# Patient Record
Sex: MALE | Race: ASIAN | Employment: OTHER | URBAN - METROPOLITAN AREA
[De-identification: names, ages, dates, MRNs, and addresses within clinical notes are randomized per-mention and may not be internally consistent; named-entity substitution may affect disease eponyms.]

---

## 2022-01-05 ENCOUNTER — TELEPHONE (OUTPATIENT)
Dept: GASTROENTEROLOGY | Age: 62
End: 2022-01-05

## 2022-01-05 ENCOUNTER — OFFICE VISIT (OUTPATIENT)
Dept: INTERNAL MEDICINE CLINIC | Age: 62
End: 2022-01-05
Payer: COMMERCIAL

## 2022-01-05 VITALS
SYSTOLIC BLOOD PRESSURE: 104 MMHG | OXYGEN SATURATION: 97 % | DIASTOLIC BLOOD PRESSURE: 64 MMHG | BODY MASS INDEX: 25.46 KG/M2 | HEIGHT: 65 IN | WEIGHT: 152.8 LBS | HEART RATE: 79 BPM

## 2022-01-05 DIAGNOSIS — Z23 NEED FOR TETANUS, DIPHTHERIA, AND ACELLULAR PERTUSSIS (TDAP) VACCINE IN PATIENT OF ADOLESCENT AGE OR OLDER: ICD-10-CM

## 2022-01-05 DIAGNOSIS — Z23 NEED FOR INFLUENZA VACCINATION: ICD-10-CM

## 2022-01-05 DIAGNOSIS — Z12.11 SCREEN FOR COLON CANCER: Primary | ICD-10-CM

## 2022-01-05 DIAGNOSIS — Z12.11 SCREEN FOR COLON CANCER: ICD-10-CM

## 2022-01-05 DIAGNOSIS — Z00.00 ANNUAL PHYSICAL EXAM: Primary | ICD-10-CM

## 2022-01-05 PROCEDURE — 90472 IMMUNIZATION ADMIN EACH ADD: CPT | Performed by: INTERNAL MEDICINE

## 2022-01-05 PROCEDURE — 99386 PREV VISIT NEW AGE 40-64: CPT | Performed by: INTERNAL MEDICINE

## 2022-01-05 PROCEDURE — 90715 TDAP VACCINE 7 YRS/> IM: CPT | Performed by: INTERNAL MEDICINE

## 2022-01-05 PROCEDURE — 90471 IMMUNIZATION ADMIN: CPT | Performed by: INTERNAL MEDICINE

## 2022-01-05 PROCEDURE — 90686 IIV4 VACC NO PRSV 0.5 ML IM: CPT | Performed by: INTERNAL MEDICINE

## 2022-01-05 SDOH — ECONOMIC STABILITY: FOOD INSECURITY: WITHIN THE PAST 12 MONTHS, YOU WORRIED THAT YOUR FOOD WOULD RUN OUT BEFORE YOU GOT MONEY TO BUY MORE.: NEVER TRUE

## 2022-01-05 SDOH — ECONOMIC STABILITY: FOOD INSECURITY: WITHIN THE PAST 12 MONTHS, THE FOOD YOU BOUGHT JUST DIDN'T LAST AND YOU DIDN'T HAVE MONEY TO GET MORE.: NEVER TRUE

## 2022-01-05 ASSESSMENT — PATIENT HEALTH QUESTIONNAIRE - PHQ9
SUM OF ALL RESPONSES TO PHQ QUESTIONS 1-9: 0
1. LITTLE INTEREST OR PLEASURE IN DOING THINGS: 0
SUM OF ALL RESPONSES TO PHQ9 QUESTIONS 1 & 2: 0
2. FEELING DOWN, DEPRESSED OR HOPELESS: 0
SUM OF ALL RESPONSES TO PHQ QUESTIONS 1-9: 0

## 2022-01-05 ASSESSMENT — SOCIAL DETERMINANTS OF HEALTH (SDOH): HOW HARD IS IT FOR YOU TO PAY FOR THE VERY BASICS LIKE FOOD, HOUSING, MEDICAL CARE, AND HEATING?: NOT HARD AT ALL

## 2022-01-05 NOTE — PROGRESS NOTES
 No Known Problems Maternal Grandmother     No Known Problems Maternal Grandfather     No Known Problems Paternal Grandmother     No Known Problems Paternal Grandfather      Social History     Socioeconomic History    Marital status:      Spouse name: Not on file    Number of children: Not on file    Years of education: Not on file    Highest education level: Not on file   Occupational History    Not on file   Tobacco Use    Smoking status: Never Smoker    Smokeless tobacco: Never Used   Vaping Use    Vaping Use: Never used   Substance and Sexual Activity    Alcohol use: Not Currently    Drug use: Never    Sexual activity: Not Currently   Other Topics Concern    Not on file   Social History Narrative    Not on file     Social Determinants of Health     Financial Resource Strain: Low Risk     Difficulty of Paying Living Expenses: Not hard at all   Food Insecurity: No Food Insecurity    Worried About [address] [employer] in the Last Year: Never true    [address] [employer] in the Last Year: Never true   Transportation Needs:     Lack of Transportation (Medical): Not on file    Lack of Transportation (Non-Medical):  Not on file   Physical Activity:     Days of Exercise per Week: Not on file    Minutes of Exercise per Session: Not on file   Stress:     Feeling of Stress : Not on file   Social Connections:     Frequency of Communication with Friends and Family: Not on file    Frequency of Social Gatherings with Friends and Family: Not on file    Attends Christian Services: Not on file    Active Member of Clubs or Organizations: Not on file    Attends Club or Organization Meetings: Not on file    Marital Status: Not on file   Intimate Partner Violence:     Fear of Current or Ex-Partner: Not on file    Emotionally Abused: Not on file    Physically Abused: Not on file    Sexually Abused: Not on file   Housing Stability:     Unable to Pay for Housing in the Last Year: Not on file    Number of Places Lived in the Last Year: Not on file    Unstable Housing in the Last Year: Not on file       Review of Systems:  A comprehensive review of systems was negative except for what was noted in the HPI. Physical Exam:   Vitals:    01/05/22 1104   BP: 104/64   Pulse: 79   SpO2: 97%   Weight: 152 lb 12.8 oz (69.3 kg)   Height: 5' 5\" (1.651 m)     Body mass index is 25.43 kg/m². Constitutional: He is oriented to person, place, and time. He appears well-developed and well-nourished. No distress. HEENT:   Head: Normocephalic and atraumatic. Right Ear: Tympanic membrane, external ear and ear canal normal.   Left Ear: Tympanic membrane, external ear and ear canal normal.   Mouth/Throat: Oropharynx is clear and moist and mucous membranes are normal. No oropharyngeal exudate or posterior oropharyngeal erythema. He has no cervical adenopathy. Eyes: Conjunctivae and extraocular motions are normal. Pupils are equal, round, and reactive to light. Neck:  Supple. No JVD present. Carotid bruit is not present. No mass and no thyromegaly present. Cardiovascular: Normal rate, regular rhythm, normal heart sounds and intact distal pulses. Pulmonary/Chest: Effort normal and breath sounds normal. No respiratory distress. He has no wheezes, rhonchi or rales. Abdominal: Soft, non-tender. Bowel sounds and aorta are normal. There is no organomegaly, mass or bruit. Musculoskeletal: Normal range of motion. He exhibits no edema. Neurological: He is alert and oriented to person, place, and time. He has normal reflexes. No cranial nerve deficit. Coordination normal.   Skin: Skin is warm, dry and intact. No suspicious lesions are noted.     Preventive Care:  Health Maintenance   Topic Date Due    Hepatitis C screen  Never done    Depression Screen  Never done    HIV screen  Never done    Diabetes screen  Never done    Lipid screen  Never done    Colon cancer screen colonoscopy  Never done    DTaP/Tdap/Td vaccine (1 - Tdap) 07/21/2007    Shingles Vaccine (1 of 2) 12/02/2009    Flu vaccine (1) Never done    COVID-19 Vaccine  Completed    Hepatitis A vaccine  Aged Out    Hepatitis B vaccine  Aged Out    Hib vaccine  Aged Out    Meningococcal (ACWY) vaccine  Aged Out    Pneumococcal 0-64 years Vaccine  Aged Out        Recommendations for [employer] Due: see orders and patient instructions/AVS.

## 2022-01-05 NOTE — TELEPHONE ENCOUNTER
Pt needs colon prep sent to [facility] pharmacy: [address]   Prep Instructions emailed to [email or web address]    1/24/2022 @ 9:15am at [facility]

## 2022-01-06 ENCOUNTER — HOSPITAL ENCOUNTER (OUTPATIENT)
Age: 62
Discharge: HOME | End: 2022-01-06
Payer: COMMERCIAL

## 2022-01-06 DIAGNOSIS — Z00.00 ANNUAL PHYSICAL EXAM: ICD-10-CM

## 2022-01-06 LAB
A/G RATIO: 1.9 (ref 1.1–2.2)
ALBUMIN SERPL-MCNC: 4.6 G/DL (ref 3.4–5)
ALP BLD-CCNC: 55 U/L (ref 40–129)
ALT SERPL-CCNC: 30 U/L (ref 10–40)
ANION GAP SERPL CALCULATED.3IONS-SCNC: 11 MMOL/L (ref 3–16)
AST SERPL-CCNC: 15 U/L (ref 15–37)
BASOPHILS ABSOLUTE: 0 K/UL (ref 0–0.2)
BASOPHILS RELATIVE PERCENT: 0.2 %
BILIRUB SERPL-MCNC: 1 MG/DL (ref 0–1)
BUN BLDV-MCNC: 13 MG/DL (ref 7–20)
CALCIUM SERPL-MCNC: 9.3 MG/DL (ref 8.3–10.6)
CHLORIDE BLD-SCNC: 102 MMOL/L (ref 99–110)
CHOLESTEROL, TOTAL: 250 MG/DL (ref 0–199)
CO2: 26 MMOL/L (ref 21–32)
CREAT SERPL-MCNC: 0.9 MG/DL (ref 0.8–1.3)
EOSINOPHILS ABSOLUTE: 0.2 K/UL (ref 0–0.6)
EOSINOPHILS RELATIVE PERCENT: 3.4 %
GFR AFRICAN AMERICAN: >60
GFR NON-AFRICAN AMERICAN: >60
GLUCOSE BLD-MCNC: 102 MG/DL (ref 70–99)
HCT VFR BLD CALC: 48.6 % (ref 40.5–52.5)
HDLC SERPL-MCNC: 50 MG/DL (ref 40–60)
HEMOGLOBIN: 15.9 G/DL (ref 13.5–17.5)
LDL CHOLESTEROL CALCULATED: 180 MG/DL
LYMPHOCYTES ABSOLUTE: 1.5 K/UL (ref 1–5.1)
LYMPHOCYTES RELATIVE PERCENT: 28.2 %
MCH RBC QN AUTO: 29.5 PG (ref 26–34)
MCHC RBC AUTO-ENTMCNC: 32.7 G/DL (ref 31–36)
MCV RBC AUTO: 90.2 FL (ref 80–100)
MONOCYTES ABSOLUTE: 0.5 K/UL (ref 0–1.3)
MONOCYTES RELATIVE PERCENT: 9.3 %
NEUTROPHILS ABSOLUTE: 3.1 K/UL (ref 1.7–7.7)
NEUTROPHILS RELATIVE PERCENT: 58.9 %
PDW BLD-RTO: 13.6 % (ref 12.4–15.4)
PLATELET # BLD: 181 K/UL (ref 135–450)
PMV BLD AUTO: 8.5 FL (ref 5–10.5)
POTASSIUM SERPL-SCNC: 4.3 MMOL/L (ref 3.5–5.1)
RBC # BLD: 5.39 M/UL (ref 4.2–5.9)
SODIUM BLD-SCNC: 139 MMOL/L (ref 136–145)
TOTAL PROTEIN: 7 G/DL (ref 6.4–8.2)
TRIGL SERPL-MCNC: 98 MG/DL (ref 0–150)
VLDLC SERPL CALC-MCNC: 20 MG/DL
WBC # BLD: 5.3 K/UL (ref 4–11)

## 2022-01-06 PROCEDURE — 36415 COLL VENOUS BLD VENIPUNCTURE: CPT

## 2022-01-06 PROCEDURE — 80053 COMPREHEN METABOLIC PANEL: CPT

## 2022-01-06 PROCEDURE — 85025 COMPLETE CBC W/AUTO DIFF WBC: CPT

## 2022-01-06 PROCEDURE — 80061 LIPID PANEL: CPT

## 2022-01-07 RX ORDER — POLYETHYLENE GLYCOL 3350 17 G/17G
238 POWDER ORAL DAILY
Qty: 255 G | Refills: 0 | Status: ON HOLD | OUTPATIENT
Start: 2022-01-07 | End: 2022-01-24 | Stop reason: HOSPADM

## 2022-01-17 ENCOUNTER — NURSE ONLY (OUTPATIENT)
Dept: INTERNAL MEDICINE CLINIC | Age: 62
End: 2022-01-17
Payer: COMMERCIAL

## 2022-01-17 DIAGNOSIS — Z23 NEED FOR SHINGLES VACCINE: Primary | ICD-10-CM

## 2022-01-17 PROCEDURE — 90471 IMMUNIZATION ADMIN: CPT | Performed by: INTERNAL MEDICINE

## 2022-01-17 PROCEDURE — 90750 HZV VACC RECOMBINANT IM: CPT | Performed by: INTERNAL MEDICINE

## 2022-01-19 ENCOUNTER — HOSPITAL ENCOUNTER (OUTPATIENT)
Age: 62
Discharge: HOME | End: 2022-01-19
Payer: COMMERCIAL

## 2022-01-19 PROCEDURE — U0003 INFECTIOUS AGENT DETECTION BY NUCLEIC ACID (DNA OR RNA); SEVERE ACUTE RESPIRATORY SYNDROME CORONAVIRUS 2 (SARS-COV-2) (CORONAVIRUS DISEASE [COVID-19]), AMPLIFIED PROBE TECHNIQUE, MAKING USE OF HIGH THROUGHPUT TECHNOLOGIES AS DESCRIBED BY CMS-2020-01-R: HCPCS

## 2022-01-19 PROCEDURE — U0005 INFEC AGEN DETEC AMPLI PROBE: HCPCS

## 2022-01-20 ENCOUNTER — ANESTHESIA EVENT (OUTPATIENT)
Dept: ENDOSCOPY | Age: 62
End: 2022-01-20
Payer: COMMERCIAL

## 2022-01-20 LAB — SARS-COV-2: NOT DETECTED

## 2022-01-22 NOTE — H&P
[address] [facility],  [facility] [address]  Phone: [phone]   Fax:[phone]    CHIEF COMPLAINT     Colon cancer screening     HPI     Thank you ANJEL KHAN MD for asking me to see Cheryl Crespo in consultation. Cheryl Crespo is a 57 y.o. male  [2]  [4] seen independently with referral for colon cancer screening. Denies abdominal pain, nausea, vomiting, fever, chills, unintentional weight loss, constipation, diarrhea, hematochezia or melenic stools. No family history of colon cancer. Last Encounter Reviewed: None  Pertinent PMH, FH, SH is reviewed below. Last EGD: None  Last Colonoscopy: None    Review of available records reveals: Wt Readings from Last 50 Encounters:   01/05/22 152 lb 12.8 oz (69.3 kg)       No components found for: HGBA1C  BP Readings from Last 3 Encounters:   01/05/22 104/64     Health Maintenance   Topic Date Due    Hepatitis C screen  Never done    HIV screen  Never done    Diabetes screen  Never done    Colon cancer screen colonoscopy  Never done    Shingles Vaccine (2 of 2) 03/14/2022    Depression Screen  01/05/2023    Lipid screen  01/06/2027    DTaP/Tdap/Td vaccine (2 - Td or Tdap) 01/05/2032    Flu vaccine  Completed    COVID-19 Vaccine  Completed    Hepatitis A vaccine  Aged Out    Hepatitis B vaccine  Aged Out    Hib vaccine  Aged Out    Meningococcal (ACWY) vaccine  Aged Out    Pneumococcal 0-64 years Vaccine  Aged Out       No components found for: [address]   No past medical history on file. FAMILY HISTORY     Family History   Problem Relation Age of Onset   Vallie Roller Cancer Mother         liver     Other Mother         hepatitis    High Blood Pressure Father     Kidney Disease Father     No Known Problems Sister     Cancer Brother         ?  TB or lung cancer    No Known Problems Maternal Grandmother     No Known Problems Maternal Grandfather     No Known Problems Paternal Grandmother     No Known Problems Paternal Grandfather      SOCIAL HISTORY     Social History     Socioeconomic History    Marital status:      Spouse name: Not on file    Number of children: Not on file    Years of education: Not on file    Highest education level: Not on file   Occupational History    Not on file   Tobacco Use    Smoking status: Never Smoker    Smokeless tobacco: Never Used   Vaping Use    Vaping Use: Never used   Substance and Sexual Activity    Alcohol use: Not Currently    Drug use: Never    Sexual activity: Not Currently     Partners: Female   Other Topics Concern    Not on file   Social History Narrative    Not on file     Social Determinants of Health     Financial Resource Strain: Low Risk     Difficulty of Paying Living Expenses: Not hard at all   Food Insecurity: No Food Insecurity    Worried About [address] in the Last Year: Never true    [address] [employer] in the Last Year: Never true   Transportation Needs:     Lack of Transportation (Medical): Not on file    Lack of Transportation (Non-Medical):  Not on file   Physical Activity:     Days of Exercise per Week: Not on file    Minutes of Exercise per Session: Not on file   Stress:     Feeling of Stress : Not on file   Social Connections:     Frequency of Communication with Friends and Family: Not on file    Frequency of Social Gatherings with Friends and Family: Not on file    Attends Confucianism Services: Not on file    Active Member of Clubs or Organizations: Not on file    Attends Club or Organization Meetings: Not on file    Marital Status: Not on file   Intimate Partner Violence:     Fear of Current or Ex-Partner: Not on file    Emotionally Abused: Not on file    Physically Abused: Not on file    Sexually Abused: Not on file   Housing Stability:     Unable to Pay for Housing in the Last Year: Not on file    Number of [place] in the Last Year: Not on file    Unstable Housing in the Last Year: Not on file     SURGICAL HISTORY   No past surgical history on file. CURRENT MEDICATIONS   (This list may include medications prescribed during this encounter as epic can not insert only the list prior to this encounter.)  Current Outpatient Rx   Medication Sig Dispense Refill    polyethylene glycol (MIRALAX) 17 GM/SCOOP POWD powder Take 238 g by mouth daily Take as directed for colonoscopy 255 g 0     ALLERGIES   No Known Allergies  IMMUNIZATIONS     Immunization History   Administered Date(s) Administered    COVID-19, Pfizer Purple top, DILUTE for use, 12+ yrs, 30mcg/0.3mL dose 03/31/2021, 04/21/2021, 12/09/2021    Influenza, Quadv, IM, PF (6 mo and older Fluzone, Flulaval, Fluarix, and 3 yrs and older Afluria) 01/05/2022    Tdap (Boostrix, Adacel) 01/05/2022    Zoster Recombinant (Shingrix) 01/17/2022     REVIEW OF SYSTEMS   See HPI for further details and pertinent postiives. Negative for the following:  Constitutional: Negative for weight change. Negative for appetite change and fatigue. HENT: Negative for nosebleeds, sore throat, mouth sores, and voice change. Respiratory: Negative for cough, choking and chest tightness. Cardiovascular: Negative for chest pain   Gastrointestinal: See HPI  Musculoskeletal: Negative for arthralgias. Skin: Negative for pallor. Neurological: Negative for weakness and light-headedness. Hematological: Negative for adenopathy. Does not bruise/bleed easily. Psychiatric/Behavioral: Negative for suicidal ideas. PHYSICAL EXAM   VITAL SIGNS: Ht 5' 5\" (1.651 m)   Wt 152 lb (68.9 kg)   BMI 25.29 kg/m²   Wt Readings from Last 3 Encounters:   01/05/22 152 lb 12.8 oz (69.3 kg)     Constitutional: Well developed, Well nourished, No acute distress, Non-toxic appearance. HENT: Normocephalic, Atraumatic, Bilateral external ears normal, Oropharynx moist, No oral exudates, Nose normal.   Eyes: Conjunctiva normal, No discharge.    Neck: Normal range of motion, No tenderness, Supple  Cardiovascular: Normal heart rate, Normal rhythm, No murmurs, No rubs, No gallops. Thorax & Lungs: Normal breath sounds, No respiratory distress, No wheezing, No chest tenderness. Abdomen: normal bowel sounds, soft, non tender, non distended, scars consistent with stated surgeries, no hernias   Rectal:  Deferred. Skin: Warm, Dry, No erythema, No rash. No bruising. Lower Extremities: Intact distal pulses, No edema, No tenderness, No cyanosis, No clubbing. Neurologic: Alert & oriented x 3, Normal motor function, Normal sensory function, No focal deficits noted. RADIOLOGY/PROCEDURES   No results found. FINAL IMPRESSION   Colon cancer screening    Plan: colonoscopy    Colonoscopy with alternatives, rationale, benefits and risks, not limited to bleeding, infection, perforation, need of surgery, prolong hospital stay and anesthesia side effects were explained. Patient verbalized understanding and agrees to proceed with colonoscopy. ORDERED FUTURE/PENDING TESTS       FOLLOWUP   No follow-ups on file.           Melecio Cano MD 1/22/22 11:36 AM EST    CC:  Michael Luis MD

## 2022-01-24 ENCOUNTER — HOSPITAL ENCOUNTER (OUTPATIENT)
Age: 62
Setting detail: OUTPATIENT SURGERY
Discharge: HOME | End: 2022-01-24
Attending: INTERNAL MEDICINE | Admitting: INTERNAL MEDICINE
Payer: COMMERCIAL

## 2022-01-24 ENCOUNTER — ANESTHESIA (OUTPATIENT)
Dept: ENDOSCOPY | Age: 62
End: 2022-01-24
Payer: COMMERCIAL

## 2022-01-24 VITALS
HEIGHT: 65 IN | RESPIRATION RATE: 14 BRPM | HEART RATE: 67 BPM | OXYGEN SATURATION: 98 % | TEMPERATURE: 97.2 F | BODY MASS INDEX: 24.99 KG/M2 | SYSTOLIC BLOOD PRESSURE: 118 MMHG | WEIGHT: 150 LBS | DIASTOLIC BLOOD PRESSURE: 76 MMHG

## 2022-01-24 VITALS — OXYGEN SATURATION: 98 % | DIASTOLIC BLOOD PRESSURE: 70 MMHG | SYSTOLIC BLOOD PRESSURE: 94 MMHG

## 2022-01-24 DIAGNOSIS — Z12.11 SCREEN FOR COLON CANCER: ICD-10-CM

## 2022-01-24 PROCEDURE — 3700000000 HC ANESTHESIA ATTENDED CARE: Performed by: INTERNAL MEDICINE

## 2022-01-24 PROCEDURE — 88305 TISSUE EXAM BY PATHOLOGIST: CPT

## 2022-01-24 PROCEDURE — 3700000001 HC ADD 15 MINUTES (ANESTHESIA): Performed by: INTERNAL MEDICINE

## 2022-01-24 PROCEDURE — 6360000002 HC RX W HCPCS: Performed by: NURSE ANESTHETIST, CERTIFIED REGISTERED

## 2022-01-24 PROCEDURE — 2580000003 HC RX 258: Performed by: ANESTHESIOLOGY

## 2022-01-24 PROCEDURE — 2720000010 HC SURG SUPPLY STERILE: Performed by: INTERNAL MEDICINE

## 2022-01-24 PROCEDURE — 45380 COLONOSCOPY AND BIOPSY: CPT | Performed by: INTERNAL MEDICINE

## 2022-01-24 PROCEDURE — 7100000011 HC PHASE II RECOVERY - ADDTL 15 MIN: Performed by: INTERNAL MEDICINE

## 2022-01-24 PROCEDURE — 2709999900 HC NON-CHARGEABLE SUPPLY: Performed by: INTERNAL MEDICINE

## 2022-01-24 PROCEDURE — 2500000003 HC RX 250 WO HCPCS: Performed by: NURSE ANESTHETIST, CERTIFIED REGISTERED

## 2022-01-24 PROCEDURE — 3609009900 HC COLONOSCOPY W/CONTROL BLEEDING ANY METHOD: Performed by: INTERNAL MEDICINE

## 2022-01-24 PROCEDURE — 3609010600 HC COLONOSCOPY POLYPECTOMY SNARE/COLD BIOPSY: Performed by: INTERNAL MEDICINE

## 2022-01-24 PROCEDURE — 7100000010 HC PHASE II RECOVERY - FIRST 15 MIN: Performed by: INTERNAL MEDICINE

## 2022-01-24 PROCEDURE — 45385 COLONOSCOPY W/LESION REMOVAL: CPT | Performed by: INTERNAL MEDICINE

## 2022-01-24 RX ORDER — LIDOCAINE HYDROCHLORIDE 20 MG/ML
INJECTION, SOLUTION EPIDURAL; INFILTRATION; INTRACAUDAL; PERINEURAL PRN
Status: DISCONTINUED | OUTPATIENT
Start: 2022-01-24 | End: 2022-01-24 | Stop reason: SDUPTHER

## 2022-01-24 RX ORDER — SODIUM CHLORIDE 9 MG/ML
25 INJECTION, SOLUTION INTRAVENOUS PRN
Status: DISCONTINUED | OUTPATIENT
Start: 2022-01-24 | End: 2022-01-24 | Stop reason: HOSPADM

## 2022-01-24 RX ORDER — PROPOFOL 10 MG/ML
INJECTION, EMULSION INTRAVENOUS PRN
Status: DISCONTINUED | OUTPATIENT
Start: 2022-01-24 | End: 2022-01-24 | Stop reason: SDUPTHER

## 2022-01-24 RX ORDER — SODIUM CHLORIDE, SODIUM LACTATE, POTASSIUM CHLORIDE, CALCIUM CHLORIDE 600; 310; 30; 20 MG/100ML; MG/100ML; MG/100ML; MG/100ML
INJECTION, SOLUTION INTRAVENOUS ONCE
Status: DISCONTINUED | OUTPATIENT
Start: 2022-01-24 | End: 2022-01-24 | Stop reason: HOSPADM

## 2022-01-24 RX ORDER — SODIUM CHLORIDE 0.9 % (FLUSH) 0.9 %
10 SYRINGE (ML) INJECTION EVERY 12 HOURS SCHEDULED
Status: DISCONTINUED | OUTPATIENT
Start: 2022-01-24 | End: 2022-01-24 | Stop reason: HOSPADM

## 2022-01-24 RX ORDER — LIDOCAINE HYDROCHLORIDE 10 MG/ML
1 INJECTION, SOLUTION EPIDURAL; INFILTRATION; INTRACAUDAL; PERINEURAL
Status: DISCONTINUED | OUTPATIENT
Start: 2022-01-24 | End: 2022-01-24 | Stop reason: HOSPADM

## 2022-01-24 RX ORDER — SODIUM CHLORIDE 0.9 % (FLUSH) 0.9 %
10 SYRINGE (ML) INJECTION PRN
Status: DISCONTINUED | OUTPATIENT
Start: 2022-01-24 | End: 2022-01-24 | Stop reason: HOSPADM

## 2022-01-24 RX ORDER — SODIUM CHLORIDE, SODIUM LACTATE, POTASSIUM CHLORIDE, CALCIUM CHLORIDE 600; 310; 30; 20 MG/100ML; MG/100ML; MG/100ML; MG/100ML
INJECTION, SOLUTION INTRAVENOUS CONTINUOUS
Status: DISCONTINUED | OUTPATIENT
Start: 2022-01-24 | End: 2022-01-24 | Stop reason: HOSPADM

## 2022-01-24 RX ADMIN — SODIUM CHLORIDE, POTASSIUM CHLORIDE, SODIUM LACTATE AND CALCIUM CHLORIDE: 600; 310; 30; 20 INJECTION, SOLUTION INTRAVENOUS at 08:41

## 2022-01-24 RX ADMIN — LIDOCAINE HYDROCHLORIDE 60 MG: 20 INJECTION, SOLUTION EPIDURAL; INFILTRATION; INTRACAUDAL; PERINEURAL at 09:14

## 2022-01-24 RX ADMIN — PROPOFOL 30 MG: 10 INJECTION, EMULSION INTRAVENOUS at 09:32

## 2022-01-24 RX ADMIN — PROPOFOL 30 MG: 10 INJECTION, EMULSION INTRAVENOUS at 09:28

## 2022-01-24 RX ADMIN — PROPOFOL 50 MG: 10 INJECTION, EMULSION INTRAVENOUS at 09:35

## 2022-01-24 RX ADMIN — PROPOFOL 40 MG: 10 INJECTION, EMULSION INTRAVENOUS at 09:25

## 2022-01-24 RX ADMIN — PROPOFOL 40 MG: 10 INJECTION, EMULSION INTRAVENOUS at 09:21

## 2022-01-24 RX ADMIN — PROPOFOL 150 MG: 10 INJECTION, EMULSION INTRAVENOUS at 09:14

## 2022-01-24 RX ADMIN — PROPOFOL 40 MG: 10 INJECTION, EMULSION INTRAVENOUS at 09:18

## 2022-01-24 RX ADMIN — PROPOFOL 20 MG: 10 INJECTION, EMULSION INTRAVENOUS at 09:34

## 2022-01-24 ASSESSMENT — PAIN SCALES - GENERAL
PAINLEVEL_OUTOF10: 0

## 2022-01-24 ASSESSMENT — PAIN - FUNCTIONAL ASSESSMENT: PAIN_FUNCTIONAL_ASSESSMENT: 0-10

## 2022-01-24 NOTE — ANESTHESIA PRE PROCEDURE
Department of Anesthesiology  Preprocedure Note       Name:  Zulma Tenorio   Age:  57 y.o.  :  1960                                          MRN:  [id number]         Date:  2022      Surgeon: Amanda Duran):  Rachel Wright MD    Procedure: Procedure(s):  COLONOSCOPY, POSSIBLE POLYPECTOMY    Medications prior to admission:   Prior to Admission medications    Medication Sig Start Date End Date Taking? Authorizing Provider   polyethylene glycol (MIRALAX) 17 GM/SCOOP POWD powder Take 238 g by mouth daily Take as directed for colonoscopy 22  Yes Rachel Wright MD       Current medications:    Current Facility-Administered Medications   Medication Dose Route Frequency Provider Last Rate Last Admin    lactated ringers infusion   IntraVENous Continuous Mina Lainez  mL/hr at 22 0841 New Bag at 22 0841    sodium chloride flush 0.9 % injection 10 mL  10 mL IntraVENous 2 times per day Mina Lainez MD        sodium chloride flush 0.9 % injection 10 mL  10 mL IntraVENous PRN Mina Lainez MD        0.9 % sodium chloride infusion  25 mL IntraVENous PRN Mina Lainez MD        lidocaine PF 1 % injection 1 mL  1 mL IntraDERmal Once PRN Mina Lainez MD        lactated ringers infusion   IntraVENous Once Rachel Wright MD           Allergies:  No Known Allergies    Problem List:  There is no problem list on file for this patient. Past Medical History:  History reviewed. No pertinent past medical history. Past Surgical History:  History reviewed. No pertinent surgical history.     Social History:    Social History     Tobacco Use    Smoking status: Never Smoker    Smokeless tobacco: Never Used   Substance Use Topics    Alcohol use: Not Currently                                Counseling given: Not Answered      Vital Signs (Current):   Vitals:    22 0852 22 0837   BP:  (!) 126/90   Pulse:  88   Resp:  18   Temp:  97.2 °F (36.2 °C)   TempSrc:  Temporal SpO2:  99%   Weight: 152 lb (68.9 kg) 150 lb (68 kg)   Height: 5' 5\" (1.651 m) 5' 5\" (1.651 m)                                              BP Readings from Last 3 Encounters:   01/24/22 (!) 126/90   01/05/22 104/64       NPO Status: Time of last liquid consumption: 0400                        Time of last solid consumption: 2200                        Date of last liquid consumption: 01/24/22                        Date of last solid food consumption: 01/22/22    BMI:   Wt Readings from Last 3 Encounters:   01/24/22 150 lb (68 kg)   01/05/22 152 lb 12.8 oz (69.3 kg)     Body mass index is 24.96 kg/m². CBC:   Lab Results   Component Value Date    WBC 5.3 01/06/2022    RBC 5.39 01/06/2022    HGB 15.9 01/06/2022    HCT 48.6 01/06/2022    MCV 90.2 01/06/2022    RDW 13.6 01/06/2022     01/06/2022       CMP:   Lab Results   Component Value Date     01/06/2022    K 4.3 01/06/2022     01/06/2022    CO2 26 01/06/2022    BUN 13 01/06/2022    CREATININE 0.9 01/06/2022    GFRAA >60 01/06/2022    AGRATIO 1.9 01/06/2022    LABGLOM >60 01/06/2022    GLUCOSE 102 01/06/2022    PROT 7.0 01/06/2022    CALCIUM 9.3 01/06/2022    BILITOT 1.0 01/06/2022    ALKPHOS 55 01/06/2022    AST 15 01/06/2022    ALT 30 01/06/2022       POC Tests: No results for input(s): POCGLU, POCNA, POCK, POCCL, POCBUN, POCHEMO, POCHCT in the last 72 hours.     Coags: No results found for: PROTIME, INR, APTT    HCG (If Applicable): No results found for: PREGTESTUR, PREGSERUM, HCG, HCGQUANT     ABGs: No results found for: PHART, PO2ART, YMI9HPE, PBZ0TYV, BEART, Q4CALLTQ     Type & Screen (If Applicable):  No results found for: LABABO, LABRH    Drug/Infectious Status (If Applicable):  No results found for: HIV, HEPCAB    COVID-19 Screening (If Applicable):   Lab Results   Component Value Date    COVID19 Not Detected 01/19/2022           Anesthesia Evaluation  Patient summary reviewed and Nursing notes reviewed no history of anesthetic complications:   Airway: Mallampati: II     Neck ROM: full   Dental:          Pulmonary:                              Cardiovascular:                      Neuro/Psych:               GI/Hepatic/Renal:             Endo/Other:                     Abdominal:             Vascular: Other Findings:             Anesthesia Plan      MAC     ASA 2     (Medications & allergies reviewed  All available lab & EKG data reviewed)  Induction: intravenous. Anesthetic plan and risks discussed with patient (via ). Plan discussed with CRNA.                 Madeleine Quezada MD   1/24/2022

## 2022-01-24 NOTE — OP NOTE
[address] Simone Martinez [facility]  Phone: [phone] [facility] [address] [facility]  Phone: [phone]   GLZ:[phone]    Colonoscopy Procedure Note    Patient: Bonnie Zarate  : 1960    Procedure: Colonoscopy with biopsy, polypectomy (cold snare)    Date:  2022     Endoscopist:  Irving Broussard MD    Referring Physician:  Ganga Ayala MD    Preoperative Diagnosis:  Screen for colon cancer [Z12.11]    Postoperative Diagnosis:  See impression    Anesthesia: Anesthesia: MAC  Sedation: Propofol per anesthesia  Start Time: 759  Stop Time:   ASA Class: 2  Mallampati: II (soft palate, uvula, fauces visible)    Indications: This is a 57y.o. year old male independently with referral for colon cancer screening     Procedure Details  Informed consent was obtained for the procedure, including sedation. Risks of perforation, hemorrhage, adverse drug reaction and aspiration were discussed. The patient was placed in the left lateral decubitus position. Based on the pre-procedure assessment, including review of the patient's medical history, medications, allergies, and review of systems, he had been deemed to be an appropriate candidate for sedation; he was therefore sedated with the medications listed below. The patient was monitored continuously with ECG tracing, pulse oximetry, blood pressure monitoring, and direct observations. rectal examination was performed. There were no external hemorrhoids, fissures or skin tags. The colonoscope was inserted into the rectum and advanced under direct vision to the cecum, which was identified by the ileocecal valve and appendiceal orifice. The right colon was examined twice as this increases polyp detection especially if other right colon polyps, older age, male, or adler syndrome. When segments could not be distended with CO2, it was filled/distended with water.   The quality of the colonic preparation was good. A careful inspection was made as the colonoscope was withdrawn, including a retroflexed view of the rectum; findings and interventions are described below. Appropriate photodocumentation Was Obtained: appendiceal orifice and ileocecal valve. Findings:   A 6 mm sessile polyp in the descending colon, removed by cold snare polypectomy and retrieved for pathology. Mild oozing noted. One hemostatic clip applied with hemostasis achieved. A 4 mm sessile polyp in the descending colon, removed by cold forceps and retrieved for pathology. No bleeding noted. A 6 mm sessile polyp in the rectum, removed by cold snare polypectomy and retrieved for pathology. Mild oozing noted. One hemostatic clip applied with hemostasis achieved. Small sized non bleeding internal hemorrhoids.     - PREP: MiraLAX and Dulcolax   - Overall difficulty: minimal in degree  - Abdominal pressure: no   - Change in position: no  - Anesthesia issues: no  - Endocoff/Amplieye use: no    Specimens: Was Obtained:     Complications:   None; patient tolerated the procedure well. Disposition:   PACU - hemodynamically stable. Estimated Blood loss:  none    Withdrawal Time:  18 minutes    Impression:   A 6 mm sessile polyp in the descending colon, removed by cold snare polypectomy and retrieved. Mild oozing noted. One hemostatic clip applied. A 4 mm sessile polyp in the descending colon, removed by cold forceps and retrieved. A 6 mm sessile polyp in the rectum, removed by cold snare polypectomy and retrieved. Mild oozing noted. One hemostatic clip applied. Small sized non bleeding internal hemorrhoids. Recommendations:  -Await pathology. ,   -Avoid NSAIDs for 5 days  -Repeat colonoscopy in 3 years. ,   -Follow up with primary care physician.         Chadd Rene MD 1/24/22 9:38 AM EST

## 2022-01-24 NOTE — ANESTHESIA POSTPROCEDURE EVALUATION
Department of Anesthesiology  Postprocedure Note    Patient: Melchor Lima  MRN: [id number]  [place]: 1960  Date of evaluation: 1/24/2022  Time:  10:13 AM     Procedure Summary     Date: 01/24/22 Room / Location: [facility] / [facility]    Anesthesia Start: 1225 Anesthesia Stop: 0941    Procedures:       COLONOSCOPY POLYPECTOMY SNARE/COLD BIOPSY (N/A )      COLONOSCOPY CONTROL HEMORRHAGE (N/A ) Diagnosis:       Screen for colon cancer      (SCREENING FOR COLON CANCER)    Surgeons: Annabella Charles MD Responsible Provider: Manuel Michelle MD    Anesthesia Type: MAC ASA Status: 2          Anesthesia Type: MAC    Reynaldo Phase I: Reynaldo Score: 10    Reynaldo Phase II: Reynaldo Score: 10    Last vitals: Reviewed and per EMR flowsheets.        Anesthesia Post Evaluation    Comments: Postoperative Anesthesia Note    Name:    Melchor Lima  MRN:      [id number]    Patient Vitals in the past 12 hrs:  01/24/22 1000, BP:114/80, Pulse:66, Resp:14, SpO2:99 %  01/24/22 0950, BP:88/65, Pulse:74, Resp:14, SpO2:96 %  01/24/22 0940, BP:89/63, Pulse:82, Resp:15, SpO2:96 %  01/24/22 0837, BP:(!) 126/90, Temp:97.2 °F (36.2 °C), Temp src:Temporal, Pulse:88, Resp:18, SpO2:99 %, Height:5' 5\" (1.651 m), Weight:150 lb (68 kg)     LABS:    CBC  Lab Results       Component                Value               Date/Time                  WBC                      5.3                 01/06/2022 09:22 AM        HGB                      15.9                01/06/2022 09:22 AM        HCT                      48.6                01/06/2022 09:22 AM        PLT                      181                 01/06/2022 09:22 AM   RENAL  Lab Results       Component                Value               Date/Time                  NA                       139                 01/06/2022 09:22 AM        K                        4.3                 01/06/2022 09:22 AM        CL                       102                 01/06/2022 09:22 AM CO2                      26                  01/06/2022 09:22 AM        BUN                      13                  01/06/2022 09:22 AM        CREATININE               0.9                 01/06/2022 09:22 AM        GLUCOSE                  102 (H)             01/06/2022 09:22 AM   COAGS  No results found for: PROTIME, INR, APTT    Intake & Output: In: 600 (I.V.:600)  Out: -     Nausea & Vomiting:  No    Level of Consciousness:  Awake    Pain Assessment:  Adequate analgesia    Anesthesia Complications:  No apparent anesthetic complications    SUMMARY      Vital signs stable  OK to discharge from Stage I post anesthesia care.   Care transferred from Anesthesiology department on discharge from perioperative area

## 2022-01-24 NOTE — PROGRESS NOTES
[employer]  #[phone]  ([address] [facility]) used for communication.  All questions answered and discharge instructions given through intrepreter

## 2022-01-24 NOTE — PROGRESS NOTES
Wife to bedside updated. Discharge instructions reviewed with patient and responsible adult. Discharge instructions signed and copy given with no additional questions. Patient to be discharged home with belongings. Clip card and aspirin sheet given. Video remote interpreting used with patient and wife.

## 2022-01-24 NOTE — PROGRESS NOTES
Wife and patient updated per Md with . Discharged in no distress accompanied to passenger side of car with family or significant other driving car. Assessment unchanged. Patient denies pain.

## 2022-01-24 NOTE — PROGRESS NOTES
Pre and post operative expectations and routines explained to patient, verbalized understanding.       [employer]  used #[id number]

## 2022-03-17 ENCOUNTER — NURSE ONLY (OUTPATIENT)
Dept: INTERNAL MEDICINE CLINIC | Age: 62
End: 2022-03-17
Payer: COMMERCIAL

## 2022-03-17 DIAGNOSIS — Z23 NEED FOR SHINGLES VACCINE: Primary | ICD-10-CM

## 2022-03-17 PROCEDURE — 99999 PR OFFICE/OUTPT VISIT,PROCEDURE ONLY: CPT | Performed by: INTERNAL MEDICINE

## 2022-03-17 PROCEDURE — 90471 IMMUNIZATION ADMIN: CPT | Performed by: INTERNAL MEDICINE

## 2022-03-17 PROCEDURE — 90750 HZV VACC RECOMBINANT IM: CPT | Performed by: INTERNAL MEDICINE

## 2023-01-16 ENCOUNTER — OFFICE VISIT (OUTPATIENT)
Dept: INTERNAL MEDICINE CLINIC | Age: 63
End: 2023-01-16
Payer: COMMERCIAL

## 2023-01-16 VITALS
BODY MASS INDEX: 26.66 KG/M2 | HEART RATE: 83 BPM | WEIGHT: 160 LBS | OXYGEN SATURATION: 96 % | HEIGHT: 65 IN | SYSTOLIC BLOOD PRESSURE: 114 MMHG | DIASTOLIC BLOOD PRESSURE: 66 MMHG

## 2023-01-16 DIAGNOSIS — Z11.4 SCREENING FOR HIV WITHOUT PRESENCE OF RISK FACTORS: ICD-10-CM

## 2023-01-16 DIAGNOSIS — Z11.59 NEED FOR HEPATITIS C SCREENING TEST: ICD-10-CM

## 2023-01-16 DIAGNOSIS — E78.00 HYPERCHOLESTEREMIA: ICD-10-CM

## 2023-01-16 DIAGNOSIS — Z23 NEED FOR INFLUENZA VACCINATION: ICD-10-CM

## 2023-01-16 DIAGNOSIS — Z00.00 ENCOUNTER FOR WELL ADULT EXAM WITHOUT ABNORMAL FINDINGS: Primary | ICD-10-CM

## 2023-01-16 DIAGNOSIS — Z91.89 RISK FOR CORONARY ARTERY DISEASE BETWEEN 10% AND 20% IN NEXT 10 YEARS: ICD-10-CM

## 2023-01-16 PROCEDURE — 90674 CCIIV4 VAC NO PRSV 0.5 ML IM: CPT | Performed by: INTERNAL MEDICINE

## 2023-01-16 PROCEDURE — 90471 IMMUNIZATION ADMIN: CPT | Performed by: INTERNAL MEDICINE

## 2023-01-16 PROCEDURE — 99396 PREV VISIT EST AGE 40-64: CPT | Performed by: INTERNAL MEDICINE

## 2023-01-16 RX ORDER — ROSUVASTATIN CALCIUM 5 MG/1
5 TABLET, COATED ORAL NIGHTLY
Qty: 30 TABLET | Refills: 0 | Status: SHIPPED | OUTPATIENT
Start: 2023-01-16

## 2023-01-16 SDOH — ECONOMIC STABILITY: FOOD INSECURITY: WITHIN THE PAST 12 MONTHS, YOU WORRIED THAT YOUR FOOD WOULD RUN OUT BEFORE YOU GOT MONEY TO BUY MORE.: NEVER TRUE

## 2023-01-16 SDOH — ECONOMIC STABILITY: FOOD INSECURITY: WITHIN THE PAST 12 MONTHS, THE FOOD YOU BOUGHT JUST DIDN'T LAST AND YOU DIDN'T HAVE MONEY TO GET MORE.: NEVER TRUE

## 2023-01-16 ASSESSMENT — PATIENT HEALTH QUESTIONNAIRE - PHQ9
SUM OF ALL RESPONSES TO PHQ9 QUESTIONS 1 & 2: 0
2. FEELING DOWN, DEPRESSED OR HOPELESS: 0
SUM OF ALL RESPONSES TO PHQ QUESTIONS 1-9: 0
1. LITTLE INTEREST OR PLEASURE IN DOING THINGS: 0
SUM OF ALL RESPONSES TO PHQ QUESTIONS 1-9: 0

## 2023-01-16 ASSESSMENT — SOCIAL DETERMINANTS OF HEALTH (SDOH): HOW HARD IS IT FOR YOU TO PAY FOR THE VERY BASICS LIKE FOOD, HOUSING, MEDICAL CARE, AND HEATING?: NOT HARD AT ALL

## 2023-01-16 NOTE — PROGRESS NOTES
[facility]  History and Physical  Manuel Kowalski M.D. Eulalio Vargas  YOB: 1960    Date of Service:  1/16/2023    Chief Complaint:   Eulalio Vargas is a 60 y.o. male who presents for   Chief Complaint   Patient presents with    Annual Exam       Assessment/Plan:    Marilee Owen was seen today for annual exam.    Diagnoses and all orders for this visit:    Encounter for well adult exam without abnormal findings  -     Lipid Panel; Future  -     Comprehensive Metabolic Panel; Future  -     CBC WITH DIFFERENTIAL/PLATELET; Future    Need for influenza vaccination  -     Influenza, FLUCELVAX, (age 10 mo+), IM, Preservative Free, 0.5 mL    Screening for HIV without presence of risk factors  -     HIV Screen; Future    Risk for coronary artery disease between 10% and 20% in next 10 years  -     rosuvastatin (CRESTOR) 5 MG tablet; Take 1 tablet by mouth nightly    Need for hepatitis C screening test  -     Hepatitis C Antibody; Future    Hypercholesteremia  Start rosuvastatin (CRESTOR) 5 MG tablet; Take 1 tablet by mouth nightly  -     Lipid Panel; Future      Return cholesterol 7/10. HPI: Here for Annual Physical and Follow up. Hyperlipidemia:  Patient is  following a low fat, low cholesterol diet. He is  exercising regularly. OTC Supplements: none.   The 10-year ASCVD risk score (Panda DK, et al., 2019) is: 10.7%    Values used to calculate the score:      Age: 60 years      Sex: Male      Is Non- : No      Diabetic: No      Tobacco smoker: No      Systolic Blood Pressure: 821 mmHg      Is BP treated: No      HDL Cholesterol: 50 mg/dL      Total Cholesterol: 250 mg/dL       No results found for: LABA1C, LABMICR  Lab Results   Component Value Date     01/06/2022    K 4.3 01/06/2022     01/06/2022    CO2 26 01/06/2022    BUN 13 01/06/2022    CREATININE 0.9 01/06/2022    GLUCOSE 102 (H) 01/06/2022    CALCIUM 9.3 01/06/2022     Lab Results   Component Value Date/Time    CHOL 250 01/06/2022 09:22 AM    TRIG 98 01/06/2022 09:22 AM    HDL 50 01/06/2022 09:22 AM    LDLCALC 180 01/06/2022 09:22 AM     Lab Results   Component Value Date    ALT 30 01/06/2022    AST 15 01/06/2022     No results found for: TSH, T4FREE, T3FREE  Lab Results   Component Value Date    WBC 5.3 01/06/2022    HGB 15.9 01/06/2022    HCT 48.6 01/06/2022    MCV 90.2 01/06/2022     01/06/2022     No results found for: INR   No results found for: PSA   No results found for: LABURIC     Wt Readings from Last 3 Encounters:   01/16/23 160 lb (72.6 kg)   01/24/22 150 lb (68 kg)   01/05/22 152 lb 12.8 oz (69.3 kg)     BP Readings from Last 3 Encounters:   01/16/23 114/66   01/24/22 94/70   01/24/22 118/76       Patient Active Problem List   Diagnosis    Internal hemorrhoids without complication    Adenomatous polyp of rectum    Adenomatous polyp of descending colon       No Known Allergies  No outpatient medications have been marked as taking for the 1/16/23 encounter (Office Visit) with Priyanka Armstrong MD.       History reviewed. No pertinent past medical history. Past Surgical History:   Procedure Laterality Date    COLONOSCOPY N/A 1/24/2022    COLONOSCOPY POLYPECTOMY SNARE/COLD BIOPSY performed by Carole Butts MD at [address] [facility] N/A 1/24/2022    COLONOSCOPY CONTROL HEMORRHAGE performed by Carole Butts MD at [address]     Family History   Problem Relation Age of Onset    Cancer Mother         liver     Other Mother         hepatitis    High Blood Pressure Father     Kidney Disease Father     No Known Problems Sister     Cancer Brother         ?  TB or lung cancer    No Known Problems Maternal Grandmother     No Known Problems Maternal Grandfather     No Known Problems Paternal Grandmother     No Known Problems Paternal Grandfather      Social History     Socioeconomic History    Marital status:      Spouse name: Not on file    Number of children: Not on file    Years of education: Not on file    Highest education level: Not on file   Occupational History    Not on file   Tobacco Use    Smoking status: Never    Smokeless tobacco: Never   Vaping Use    Vaping Use: Never used   Substance and Sexual Activity    Alcohol use: Not Currently    Drug use: Never    Sexual activity: Not Currently     Partners: Female   Other Topics Concern    Not on file   Social History Narrative    Not on file     Social Determinants of Health     Financial Resource Strain: Low Risk     Difficulty of Paying Living Expenses: Not hard at all   Food Insecurity: No Food Insecurity    Worried About Running Out of Food in the Last Year: Never true    Ran Out of Food in the Last Year: Never true   Transportation Needs: Not on file   Physical Activity: Not on file   Stress: Not on file   Social Connections: Not on file   Intimate Partner Violence: Not on file   Housing Stability: Not on file       Review of Systems:  A comprehensive review of systems was negative except for what was noted in the HPI. Physical Exam:   Vitals:    01/16/23 0752   BP: 114/66   Pulse: 83   SpO2: 96%   Weight: 160 lb (72.6 kg)   Height: 5' 5\" (1.651 m)     Body mass index is 26.63 kg/m². Constitutional: He is oriented to person, place, and time. He appears well-developed and well-nourished. No distress. HEENT:   Head: Normocephalic and atraumatic. Right Ear: Tympanic membrane, external ear and ear canal normal.   Left Ear: Tympanic membrane, external ear and ear canal normal.   Mouth/Throat: Oropharynx is clear and moist and mucous membranes are normal. No oropharyngeal exudate or posterior oropharyngeal erythema. He has no cervical adenopathy. Eyes: Conjunctivae and extraocular motions are normal. Pupils are equal, round, and reactive to light. Neck:  Supple. No JVD present. Carotid bruit is not present. No mass and no thyromegaly present.    Cardiovascular: Normal rate, regular rhythm, normal heart sounds and intact distal pulses. Pulmonary/Chest: Effort normal and breath sounds normal. No respiratory distress. He has no wheezes, rhonchi or rales. Abdominal: Soft, non-tender. Bowel sounds and aorta are normal. There is no organomegaly, mass or bruit. Musculoskeletal: Normal range of motion. He exhibits no edema. Neurological: He is alert and oriented to person, place, and time. He has normal reflexes. No cranial nerve deficit. Coordination normal.   Skin: Skin is warm, dry and intact. No suspicious lesions are noted.     Preventive Care:  Health Maintenance   Topic Date Due    HIV screen  Never done    Hepatitis C screen  Never done    Diabetes screen  Never done    Flu vaccine (1) 08/01/2022    Depression Screen  01/05/2023    Lipids  01/06/2027    Colorectal Cancer Screen  01/24/2027    DTaP/Tdap/Td vaccine (2 - Td or Tdap) 01/05/2032    Shingles vaccine  Completed    COVID-19 Vaccine  Completed    Hepatitis A vaccine  Aged Out    Hib vaccine  Aged Out    Meningococcal (ACWY) vaccine  Aged Out    Pneumococcal 0-64 years Vaccine  Aged Out        Recommendations for [employer] Due: see orders and patient instructions/AVS.

## 2023-01-16 NOTE — PATIENT INSTRUCTIONS
Well Visit, Ages 25 to 72: Care Instructions  Well visits can help you stay healthy. Your doctor has checked your overall health and may have suggested ways to take good care of yourself. Your doctor also may have recommended tests. You can help prevent illness with healthy eating, good sleep, vaccinations, regular exercise, and other steps. Get the tests that you and your doctor decide on. Depending on your age and risks, examples might include screening for diabetes; hepatitis C; HIV; and cervical, breast, lung, and colon cancer. Screening helps find diseases before any symptoms appear. Eat healthy foods. Choose fruits, vegetables, whole grains, lean protein, and low-fat dairy foods. Limit saturated fat and reduce salt. Limit alcohol. Men should have no more than 2 drinks a day. Women should have no more than 1. For some people, no alcohol is the best choice. Exercise. Get at least 30 minutes of exercise on most days of the week. Walking can be a good choice. Reach and stay at your healthy weight. This will lower your risk for many health problems. Take care of your mental health. Try to stay connected with friends, family, and community, and find ways to manage stress. If you're feeling depressed or hopeless, talk to someone. A counselor can help. If you don't have a counselor, talk to your doctor. Talk to your doctor if you think you may have a problem with alcohol or drug use. This includes prescription medicines and illegal drugs. Avoid tobacco and nicotine: Don't smoke, vape, or chew. If you need help quitting, talk to your doctor. Practice safer sex. Getting tested, using condoms or dental dams, and limiting sex partners can help prevent STIs. Use birth control if it's important to you to prevent pregnancy. Talk with your doctor about your choices and what might be best for you. Prevent problems where you can.  Protect your skin from too much sun, wash your hands, brush your teeth twice a day, and wear a seat belt in the car. Where can you learn more? Go to http://www.ashton.com/ and enter P072 to learn more about \"Well Visit, Ages 25 to 72: Care Instructions. \"  Current as of: March 9, 2022               Content Version: 13.5  © 3919-1700 [employer]. Care instructions adapted under license by [facility] ([facility]). If you have questions about a medical condition or this instruction, always ask your healthcare professional. [address] any warranty or liability for your use of this information.

## 2023-02-14 ENCOUNTER — PATIENT MESSAGE (OUTPATIENT)
Dept: INTERNAL MEDICINE CLINIC | Age: 63
End: 2023-02-14

## 2023-02-14 DIAGNOSIS — E78.00 HYPERCHOLESTEREMIA: ICD-10-CM

## 2023-02-14 DIAGNOSIS — Z91.89 RISK FOR CORONARY ARTERY DISEASE BETWEEN 10% AND 20% IN NEXT 10 YEARS: ICD-10-CM

## 2023-02-14 RX ORDER — ROSUVASTATIN CALCIUM 5 MG/1
5 TABLET, COATED ORAL NIGHTLY
Qty: 90 TABLET | Refills: 1 | Status: SHIPPED | OUTPATIENT
Start: 2023-02-14

## 2023-02-14 NOTE — TELEPHONE ENCOUNTER
From: Rod Guzman  To: Dr. Feliciano Allen [facility]  Sent: 2/14/2023 9:30 AM EST  Subject: Crestor refill    Good morning! Dr. Elsi Hidalgo, do I need a Crestor refill based on my recent test results (I have two doses left. )? Thank you so much for your medical advices.

## 2023-02-20 RX ORDER — ROSUVASTATIN CALCIUM 5 MG/1
5 TABLET, COATED ORAL NIGHTLY
Qty: 90 TABLET | Refills: 1 | Status: SHIPPED | OUTPATIENT
Start: 2023-02-20

## 2024-01-10 ENCOUNTER — OFFICE VISIT (OUTPATIENT)
Dept: INTERNAL MEDICINE CLINIC | Age: 64
End: 2024-01-10
Payer: COMMERCIAL

## 2024-01-10 VITALS
HEIGHT: 65 IN | SYSTOLIC BLOOD PRESSURE: 102 MMHG | BODY MASS INDEX: 26.33 KG/M2 | HEART RATE: 76 BPM | DIASTOLIC BLOOD PRESSURE: 72 MMHG | WEIGHT: 158 LBS | OXYGEN SATURATION: 97 %

## 2024-01-10 DIAGNOSIS — Z91.89 RISK FOR CORONARY ARTERY DISEASE BETWEEN 10% AND 20% IN NEXT 10 YEARS: ICD-10-CM

## 2024-01-10 DIAGNOSIS — Z00.00 ENCOUNTER FOR WELL ADULT EXAM WITHOUT ABNORMAL FINDINGS: Primary | ICD-10-CM

## 2024-01-10 DIAGNOSIS — E78.00 HYPERCHOLESTEREMIA: ICD-10-CM

## 2024-01-10 PROCEDURE — 99396 PREV VISIT EST AGE 40-64: CPT | Performed by: INTERNAL MEDICINE

## 2024-01-10 RX ORDER — ROSUVASTATIN CALCIUM 5 MG/1
5 TABLET, COATED ORAL NIGHTLY
Qty: 90 TABLET | Refills: 3 | Status: SHIPPED | OUTPATIENT
Start: 2024-01-10

## 2024-01-10 ASSESSMENT — PATIENT HEALTH QUESTIONNAIRE - PHQ9
1. LITTLE INTEREST OR PLEASURE IN DOING THINGS: 0
SUM OF ALL RESPONSES TO PHQ QUESTIONS 1-9: 0
2. FEELING DOWN, DEPRESSED OR HOPELESS: 0
SUM OF ALL RESPONSES TO PHQ9 QUESTIONS 1 & 2: 0
SUM OF ALL RESPONSES TO PHQ QUESTIONS 1-9: 0

## 2024-01-10 NOTE — PROGRESS NOTES
[facility]  History and Physical  Nancy Stearns M.D.        Javad Smith  YOB: 1960    Date of Service:  1/10/2024    Chief Complaint:   Javad Smith is a 63 y.o. male who presents for   Chief Complaint   Patient presents with    Annual Exam       Assessment/Plan:    Javad was seen today for annual exam.    Diagnoses and all orders for this visit:    Encounter for well adult exam without abnormal findings  -     Lipid Panel; Future  -     Comprehensive Metabolic Panel; Future  -     CBC with Auto Differential; Future    Hypercholesteremia  -     rosuvastatin (CRESTOR) 5 MG tablet; Take 1 tablet by mouth nightly  -     CT CARDIAC CALCIUM SCORING; Future    Risk for coronary artery disease between 10% and 20% in next 10 years  -     rosuvastatin (CRESTOR) 5 MG tablet; Take 1 tablet by mouth nightly  -     CT CARDIAC CALCIUM SCORING; Future      Return Fasting Welcome to Medicare and f/u 1/9.      HPI: Here for Annual Physical and Follow up.    Hyperlipidemia:  mild muscle weakness on rosuvastatin (Crestor) 5 mg qd. Medication compliance: compliant most of the time. Patient is  following a low fat, low cholesterol diet.  He is  exercising regularly.    The 10-year ASCVD risk score (Panda LOMOIS, et al., 2019) is: 7.6%    Values used to calculate the score:      Age: 63 years      Sex: Male      Is Non- : No      Diabetic: No      Tobacco smoker: No      Systolic Blood Pressure: 102 mmHg      Is BP treated: No      HDL Cholesterol: 45 mg/dL      Total Cholesterol: 170 mg/dL    No results found for: \"LABA1C\"  Lab Results   Component Value Date     02/13/2023    K 4.4 02/13/2023     02/13/2023    CO2 25 02/13/2023    BUN 16 02/13/2023    CREATININE 1.0 02/13/2023    GLUCOSE 108 (H) 02/13/2023    CALCIUM 9.3 02/13/2023     Lab Results   Component Value Date/Time    CHOL 170 02/13/2023 08:38 AM    TRIG 119 02/13/2023 08:38 AM    HDL 45 02/13/2023 08:38 AM

## 2024-01-10 NOTE — PATIENT INSTRUCTIONS
Well Visit, Ages 18 to 65: Care Instructions  Well visits can help you stay healthy. Your doctor has checked your overall health and may have suggested ways to take good care of yourself. Your doctor also may have recommended tests. You can help prevent illness with healthy eating, good sleep, vaccinations, regular exercise, and other steps.    Get the tests that you and your doctor decide on. Depending on your age and risks, examples might include screening for diabetes; hepatitis C; HIV; and cervical, breast, lung, and colon cancer. Screening helps find diseases before any symptoms appear.   Eat healthy foods. Choose fruits, vegetables, whole grains, lean protein, and low-fat dairy foods. Limit saturated fat and reduce salt.     Limit alcohol. Men should have no more than 2 drinks a day. Women should have no more than 1. For some people, no alcohol is the best choice.   Exercise. Get at least 30 minutes of exercise on most days of the week. Walking can be a good choice.     Reach and stay at your healthy weight. This will lower your risk for many health problems.   Take care of your mental health. Try to stay connected with friends, family, and community, and find ways to manage stress.     If you're feeling depressed or hopeless, talk to someone. A counselor can help. If you don't have a counselor, talk to your doctor.   Talk to your doctor if you think you may have a problem with alcohol or drug use. This includes prescription medicines, marijuana, and other drugs.     Avoid tobacco and nicotine: Don't smoke, vape, or chew. If you need help quitting, talk to your doctor.   Practice safer sex. Getting tested, using condoms or dental dams, and limiting sex partners can help prevent STIs.     Use birth control if it's important to you to prevent pregnancy. Talk with your doctor about your choices and what might be best for you.   Prevent problems where you can. Protect your skin from too much sun, wash your

## 2024-01-22 ENCOUNTER — TELEPHONE (OUTPATIENT)
Dept: INTERNAL MEDICINE CLINIC | Age: 64
End: 2024-01-22

## 2024-01-22 NOTE — TELEPHONE ENCOUNTER
Spoke with patient and informed him that his insurance company needs a PA to do the CT Calcium Scoring, PA can get started once test is scheduled. [employer] is not in network. Must be done at hospital.  He will get labs done next month. He does NOT want to get test. He asked me to cancel it, advised him I will try to cancel, otherwise tell hospital that he does not want it right now.

## 2024-02-23 DIAGNOSIS — Z00.00 ENCOUNTER FOR WELL ADULT EXAM WITHOUT ABNORMAL FINDINGS: ICD-10-CM

## 2024-02-23 LAB
ALBUMIN SERPL-MCNC: 4.8 G/DL (ref 3.4–5)
ALBUMIN/GLOB SERPL: 2.1 {RATIO} (ref 1.1–2.2)
ALP SERPL-CCNC: 46 U/L (ref 40–129)
ALT SERPL-CCNC: 33 U/L (ref 10–40)
ANION GAP SERPL CALCULATED.3IONS-SCNC: 13 MMOL/L (ref 3–16)
AST SERPL-CCNC: 18 U/L (ref 15–37)
BASOPHILS # BLD: 0 K/UL (ref 0–0.2)
BASOPHILS NFR BLD: 0.6 %
BILIRUB SERPL-MCNC: 1 MG/DL (ref 0–1)
BUN SERPL-MCNC: 17 MG/DL (ref 7–20)
CALCIUM SERPL-MCNC: 9.4 MG/DL (ref 8.3–10.6)
CHLORIDE SERPL-SCNC: 102 MMOL/L (ref 99–110)
CHOLEST SERPL-MCNC: 179 MG/DL (ref 0–199)
CO2 SERPL-SCNC: 27 MMOL/L (ref 21–32)
CREAT SERPL-MCNC: 1.1 MG/DL (ref 0.8–1.3)
DEPRECATED RDW RBC AUTO: 13.5 % (ref 12.4–15.4)
EOSINOPHIL # BLD: 0.2 K/UL (ref 0–0.6)
EOSINOPHIL NFR BLD: 4.1 %
GFR SERPLBLD CREATININE-BSD FMLA CKD-EPI: >60 ML/MIN/{1.73_M2}
GLUCOSE SERPL-MCNC: 107 MG/DL (ref 70–99)
HCT VFR BLD AUTO: 48.7 % (ref 40.5–52.5)
HDLC SERPL-MCNC: 41 MG/DL (ref 40–60)
HGB BLD-MCNC: 15.9 G/DL (ref 13.5–17.5)
LDLC SERPL CALC-MCNC: 118 MG/DL
LYMPHOCYTES # BLD: 2.4 K/UL (ref 1–5.1)
LYMPHOCYTES NFR BLD: 40.4 %
MCH RBC QN AUTO: 30 PG (ref 26–34)
MCHC RBC AUTO-ENTMCNC: 32.7 G/DL (ref 31–36)
MCV RBC AUTO: 91.8 FL (ref 80–100)
MONOCYTES # BLD: 0.6 K/UL (ref 0–1.3)
MONOCYTES NFR BLD: 10.1 %
NEUTROPHILS # BLD: 2.7 K/UL (ref 1.7–7.7)
NEUTROPHILS NFR BLD: 44.8 %
PLATELET # BLD AUTO: 163 K/UL (ref 135–450)
PMV BLD AUTO: 9.5 FL (ref 5–10.5)
POTASSIUM SERPL-SCNC: 4.3 MMOL/L (ref 3.5–5.1)
PROT SERPL-MCNC: 7.1 G/DL (ref 6.4–8.2)
RBC # BLD AUTO: 5.31 M/UL (ref 4.2–5.9)
SODIUM SERPL-SCNC: 142 MMOL/L (ref 136–145)
TRIGL SERPL-MCNC: 98 MG/DL (ref 0–150)
VLDLC SERPL CALC-MCNC: 20 MG/DL
WBC # BLD AUTO: 6 K/UL (ref 4–11)

## 2025-01-13 ASSESSMENT — PATIENT HEALTH QUESTIONNAIRE - PHQ9
SUM OF ALL RESPONSES TO PHQ9 QUESTIONS 1 & 2: 0
SUM OF ALL RESPONSES TO PHQ QUESTIONS 1-9: 0
SUM OF ALL RESPONSES TO PHQ QUESTIONS 1-9: 0
2. FEELING DOWN, DEPRESSED OR HOPELESS: NOT AT ALL
1. LITTLE INTEREST OR PLEASURE IN DOING THINGS: NOT AT ALL
SUM OF ALL RESPONSES TO PHQ9 QUESTIONS 1 & 2: 0
SUM OF ALL RESPONSES TO PHQ QUESTIONS 1-9: 0
1. LITTLE INTEREST OR PLEASURE IN DOING THINGS: NOT AT ALL
2. FEELING DOWN, DEPRESSED OR HOPELESS: NOT AT ALL
SUM OF ALL RESPONSES TO PHQ QUESTIONS 1-9: 0

## 2025-01-14 ENCOUNTER — OFFICE VISIT (OUTPATIENT)
Dept: INTERNAL MEDICINE CLINIC | Age: 65
End: 2025-01-14

## 2025-01-14 VITALS
WEIGHT: 156 LBS | OXYGEN SATURATION: 99 % | BODY MASS INDEX: 25.99 KG/M2 | DIASTOLIC BLOOD PRESSURE: 80 MMHG | HEIGHT: 65 IN | SYSTOLIC BLOOD PRESSURE: 128 MMHG | HEART RATE: 72 BPM

## 2025-01-14 DIAGNOSIS — Z23 NEED FOR IMMUNIZATION AGAINST INFLUENZA: ICD-10-CM

## 2025-01-14 DIAGNOSIS — Z91.89 RISK FOR CORONARY ARTERY DISEASE BETWEEN 10% AND 20% IN NEXT 10 YEARS: ICD-10-CM

## 2025-01-14 DIAGNOSIS — Z23 NEED FOR PROPHYLACTIC VACCINATION AGAINST STREPTOCOCCUS PNEUMONIAE (PNEUMOCOCCUS): ICD-10-CM

## 2025-01-14 DIAGNOSIS — Z00.00 ANNUAL PHYSICAL EXAM: Primary | ICD-10-CM

## 2025-01-14 DIAGNOSIS — E78.00 HYPERCHOLESTEREMIA: ICD-10-CM

## 2025-01-14 RX ORDER — ROSUVASTATIN CALCIUM 5 MG/1
5 TABLET, COATED ORAL NIGHTLY
Qty: 90 TABLET | Refills: 3 | Status: SHIPPED | OUTPATIENT
Start: 2025-01-14

## 2025-01-14 SDOH — ECONOMIC STABILITY: FOOD INSECURITY: WITHIN THE PAST 12 MONTHS, THE FOOD YOU BOUGHT JUST DIDN'T LAST AND YOU DIDN'T HAVE MONEY TO GET MORE.: NEVER TRUE

## 2025-01-14 SDOH — ECONOMIC STABILITY: FOOD INSECURITY: WITHIN THE PAST 12 MONTHS, YOU WORRIED THAT YOUR FOOD WOULD RUN OUT BEFORE YOU GOT MONEY TO BUY MORE.: NEVER TRUE

## 2025-01-14 ASSESSMENT — LIFESTYLE VARIABLES
HOW MANY STANDARD DRINKS CONTAINING ALCOHOL DO YOU HAVE ON A TYPICAL DAY: PATIENT DOES NOT DRINK
HOW OFTEN DO YOU HAVE A DRINK CONTAINING ALCOHOL: NEVER

## 2025-01-14 ASSESSMENT — PATIENT HEALTH QUESTIONNAIRE - PHQ9
SUM OF ALL RESPONSES TO PHQ9 QUESTIONS 1 & 2: 0
1. LITTLE INTEREST OR PLEASURE IN DOING THINGS: NOT AT ALL
SUM OF ALL RESPONSES TO PHQ QUESTIONS 1-9: 0
2. FEELING DOWN, DEPRESSED OR HOPELESS: NOT AT ALL

## 2025-01-14 NOTE — PATIENT INSTRUCTIONS

## 2025-01-15 LAB
ALBUMIN SERPL-MCNC: 4.7 G/DL (ref 3.4–5)
ALBUMIN/GLOB SERPL: 2 {RATIO} (ref 1.1–2.2)
ALP SERPL-CCNC: 45 U/L (ref 40–129)
ALT SERPL-CCNC: 42 U/L (ref 10–40)
ANION GAP SERPL CALCULATED.3IONS-SCNC: 12 MMOL/L (ref 3–16)
AST SERPL-CCNC: 21 U/L (ref 15–37)
BASOPHILS # BLD: 0 K/UL (ref 0–0.2)
BASOPHILS NFR BLD: 0.8 %
BILIRUB SERPL-MCNC: 0.9 MG/DL (ref 0–1)
BUN SERPL-MCNC: 14 MG/DL (ref 7–20)
CALCIUM SERPL-MCNC: 9.4 MG/DL (ref 8.3–10.6)
CHLORIDE SERPL-SCNC: 103 MMOL/L (ref 99–110)
CHOLEST SERPL-MCNC: 184 MG/DL (ref 0–199)
CO2 SERPL-SCNC: 27 MMOL/L (ref 21–32)
CREAT SERPL-MCNC: 1 MG/DL (ref 0.8–1.3)
DEPRECATED RDW RBC AUTO: 14 % (ref 12.4–15.4)
EOSINOPHIL # BLD: 0.1 K/UL (ref 0–0.6)
EOSINOPHIL NFR BLD: 2 %
GFR SERPLBLD CREATININE-BSD FMLA CKD-EPI: 83 ML/MIN/{1.73_M2}
GLUCOSE SERPL-MCNC: 106 MG/DL (ref 70–99)
HCT VFR BLD AUTO: 49.1 % (ref 40.5–52.5)
HDLC SERPL-MCNC: 49 MG/DL (ref 40–60)
HGB BLD-MCNC: 16.2 G/DL (ref 13.5–17.5)
LDLC SERPL CALC-MCNC: 111 MG/DL
LYMPHOCYTES # BLD: 1.8 K/UL (ref 1–5.1)
LYMPHOCYTES NFR BLD: 31.6 %
MCH RBC QN AUTO: 30.3 PG (ref 26–34)
MCHC RBC AUTO-ENTMCNC: 33.1 G/DL (ref 31–36)
MCV RBC AUTO: 91.6 FL (ref 80–100)
MONOCYTES # BLD: 0.4 K/UL (ref 0–1.3)
MONOCYTES NFR BLD: 7.8 %
NEUTROPHILS # BLD: 3.3 K/UL (ref 1.7–7.7)
NEUTROPHILS NFR BLD: 57.8 %
PLATELET # BLD AUTO: 177 K/UL (ref 135–450)
PMV BLD AUTO: 9.2 FL (ref 5–10.5)
POTASSIUM SERPL-SCNC: 4.2 MMOL/L (ref 3.5–5.1)
PROT SERPL-MCNC: 7.1 G/DL (ref 6.4–8.2)
RBC # BLD AUTO: 5.35 M/UL (ref 4.2–5.9)
SODIUM SERPL-SCNC: 142 MMOL/L (ref 136–145)
TRIGL SERPL-MCNC: 121 MG/DL (ref 0–150)
VLDLC SERPL CALC-MCNC: 24 MG/DL
WBC # BLD AUTO: 5.7 K/UL (ref 4–11)

## (undated) DEVICE — ENDOSCOPIC KIT 2 12 FT OP4 DE2 GWN SYR

## (undated) DEVICE — CANNULA NSL AD TBNG L7FT PVC STR NONFLARED PRNG O2 DEL W STD

## (undated) DEVICE — ELECTRODE,ECG,STRESS,FOAM,3PK: Brand: MEDLINE

## (undated) DEVICE — SNARE ENDOSCP L240CM SHTH DIA24MM LOOP W10MM POLYP RND REINF

## (undated) DEVICE — FORCEPS BX L240CM JAW DIA2.8MM L CAP W/ NDL MIC MESH TOOTH

## (undated) DEVICE — CLIP LIG L235CM RESOL 360 BX/20